# Patient Record
Sex: FEMALE | Race: WHITE | NOT HISPANIC OR LATINO | Employment: STUDENT | URBAN - METROPOLITAN AREA
[De-identification: names, ages, dates, MRNs, and addresses within clinical notes are randomized per-mention and may not be internally consistent; named-entity substitution may affect disease eponyms.]

---
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
## 2024-03-18 ENCOUNTER — OFFICE VISIT (OUTPATIENT)
Dept: PEDIATRICS | Facility: CLINIC | Age: 19
End: 2024-03-18
Payer: COMMERCIAL

## 2024-03-18 VITALS — TEMPERATURE: 97.7 F | WEIGHT: 144.4 LBS | OXYGEN SATURATION: 99 % | HEART RATE: 82 BPM | BODY MASS INDEX: 23.54 KG/M2

## 2024-03-18 DIAGNOSIS — H66.002 NON-RECURRENT ACUTE SUPPURATIVE OTITIS MEDIA OF LEFT EAR WITHOUT SPONTANEOUS RUPTURE OF TYMPANIC MEMBRANE: Primary | ICD-10-CM

## 2024-03-18 PROCEDURE — 1036F TOBACCO NON-USER: CPT | Performed by: PEDIATRICS

## 2024-03-18 PROCEDURE — 99213 OFFICE O/P EST LOW 20 MIN: CPT | Performed by: PEDIATRICS

## 2024-03-18 RX ORDER — AMOXICILLIN AND CLAVULANATE POTASSIUM 875; 125 MG/1; MG/1
875 TABLET, FILM COATED ORAL 2 TIMES DAILY
Qty: 20 TABLET | Refills: 0 | Status: SHIPPED | OUTPATIENT
Start: 2024-03-18 | End: 2024-03-28

## 2024-03-18 RX ORDER — AMOXICILLIN AND CLAVULANATE POTASSIUM 875; 125 MG/1; MG/1
1 TABLET, FILM COATED ORAL 2 TIMES DAILY
COMMUNITY
Start: 2024-02-28 | End: 2024-03-04

## 2024-03-18 RX ORDER — FLUTICASONE PROPIONATE 50 MCG
2 SPRAY, SUSPENSION (ML) NASAL
COMMUNITY
Start: 2024-02-13

## 2024-03-18 NOTE — LETTER
March 18, 2024     Patient: Minnie Read   YOB: 2005   Date of Visit: 3/18/2024       To Whom It May Concern:    Minnie Read was seen in my clinic on 3/18/2024 at 1:15 pm. Please excuse Minnie for her absence from school on this day to make the appointment.    If you have any questions or concerns, please don't hesitate to call.         Sincerely,         Tana Contreras MD PhD        CC: No Recipients

## 2024-03-18 NOTE — PROGRESS NOTES
Subjective   Patient ID: Minnie Read is a 18 y.o. female, otherwise healthy, who presents for URI (Was given 5 days of Augmentin for a sinus infection a few weeks ago. It relieved symptoms, then 2 days after the medication ended all symptoms came back. She has also had left ear pain since 2/6/24 and believes the ear drum may have ruptured. ).  She presents alone.    HPI  Minnie Read is a 18 y.o. female presenting for a sick visit. The patient was given 5 days of Augmentin 875 for a sinus infection a few weeks ago. It initially relieved symptoms, but symptoms returned two days after the medication was completed. The patient developed left ear pain with drainage on 3/6/24. She believes her eardrum may have ruptured. She reports cough and nasal congestion.     Review of Systems  The following history was obtained from patient.   Constitutional: Otherwise denies fever, chills, or changes in behavior. No difficulties with sleeping, eating, drinking, urine output, or bowel movements.    Eyes, ENT: Positive left ear pain with drainage, nasal congestion. Denies eye complaints, runny nose, or sore throat.   Cardio/Resp: Positive cough. Denies chest pain, palpitations, shortness of breath, wheezing, stridor at rest, working hard to breathe, or breathing fast.   GI/Renal: Denies nausea, vomiting, stomachache, diarrhea, or constipation. Denies dysuria or abnormal urine color or smell.   Musculoskeletal/Skin: Denies muscle or joint complaints. Denies skin rash.   Neuro/Psych: Denies headache, dizziness, confusion, irritability, or fussiness.   Endo/heme/lymph: Denies excessive thirst, excessive sweating, bruising, bleeding, or swollen glands.     Objective   Pulse 82   Temp 36.5 °C (97.7 °F) (Temporal)   Wt 65.5 kg (144 lb 6.4 oz)   SpO2 99%   BMI 23.54 kg/m²   BSA: 1.74 meters squared  Growth percentiles: No height on file for this encounter. 76 %ile (Z= 0.69) based on CDC (Girls, 2-20 Years) weight-for-age data  using vitals from 3/18/2024.     Physical Exam  Constitutional: Well developed, well nourished, well hydrated and no acute distress.  Head and Face: Normocephalic, atraumatic.  Inspection and palpation of the face: Normal.  Eyes: Conjunctiva and lids normal.  Ears, Nose, Mouth, and Throat: Left eardrum half-filled with pus. Post-nasal drip. External ears and nose without deformities.  Neck: No significant cervical adenopathy. Thyroid not enlarged.  Pulmonary: No grunting, flaring or retractions. Clear to auscultation.  Cardiovascular: Regular rate and rhythm. No significant murmur.  Chest: Normal without deformity.  Abdomen: Soft, non-tender, no masses. No hepatomegaly or splenomegaly.   Skin: No significant rash or lesions.    Problem List Items Addressed This Visit             ICD-10-CM       ENT    Non-recurrent acute suppurative otitis media of left ear without spontaneous rupture of tympanic membrane - Primary H66.002    Relevant Medications    amoxicillin-pot clavulanate (Augmentin) 875-125 mg tablet     Time in: 1:34 pm  Time done: 1:39 pm    Assessment/Plan    Minnie presents for a sick visit.    Your child has a left ear infection, or otitis media, and I have prescribed Augmentin 875 mg, and your child's dose is 1 tablet(s) twice a day for 10 days.      If your child starts to have diarrhea, I would recommend strongly giving your child acidophilus. You can give this to him/her as Culturelle, Florastor, Align, or other types. I would give your child a one-unit dose 2 hours after giving the antibiotic. If you give it at the same time as the antibiotic, there will be decreased effectiveness of the antibiotic. Ask the pharmacist what the one-unit dose for your child would be. Probiotics are not controlled by the FDA, so there is no unified dosing. Call if your child gets worse.      Colds can last up to 2 weeks and do not need antibiotics, as they are caused by a virus. There are things you can do to help a  cold get better faster.      #1 Hydrating your child will help a lot. For example, for an older child, 4-6 of the 16-ounce water bottles per day will help flush the virus from the system. Make sure your child is urinating once every 8 hours if they are older than one year old, and once every 6 hours if they are under the age of 1.      #2 Nasal saline washes will also clear the sinuses and not allow the mucous to get infected.      #3 Cool mist humidifier in the bedroom at night will help thin out the mucus as well. Just make sure it is cleaned regularly or you may be putting yeast spores into the environment. Near the humidifier equipment in all drugstores, you can find small balls that you put into the water of a cool mist humidifier, and it prevents growth of anything or the sliminess for up to a month. One brand is Protect.      #4 Vitamin and zinc supplements may also help to some degree. Please give zinc on a full stomach, as sometimes it can make children nauseous. Children from 1-6 years old may have 25 mg of zinc per day. Older than that may have 50 mg per day.     Scribe Attestation  By signing my name below, I, Karolyn Stewart, attest that this documentation has been prepared under the direction and in the presence of Dr. Tana Contreras.    Provider Attestation - Scribe documentation  All medical record entries made by the Scribe were at my direction and personally dictated by me. I have reviewed the chart and agree that the record accurately reflects my personal performance of the history, physical exam, discussion and plan.

## 2024-03-18 NOTE — PATIENT INSTRUCTIONS
Minnie presents for a sick visit.    Your child has a left ear infection, or otitis media, and I have prescribed Augmentin 875 mg, and your child's dose is 1 tablet(s) twice a day for 10 days.      If your child starts to have diarrhea, I would recommend strongly giving your child acidophilus. You can give this to him/her as Culturelle, Florastor, Align, or other types. I would give your child a one-unit dose 2 hours after giving the antibiotic. If you give it at the same time as the antibiotic, there will be decreased effectiveness of the antibiotic. Ask the pharmacist what the one-unit dose for your child would be. Probiotics are not controlled by the FDA, so there is no unified dosing. Call if your child gets worse.      Colds can last up to 2 weeks and do not need antibiotics, as they are caused by a virus. There are things you can do to help a cold get better faster.      #1 Hydrating your child will help a lot. For example, for an older child, 4-6 of the 16-ounce water bottles per day will help flush the virus from the system. Make sure your child is urinating once every 8 hours if they are older than one year old, and once every 6 hours if they are under the age of 1.      #2 Nasal saline washes will also clear the sinuses and not allow the mucous to get infected.      #3 Cool mist humidifier in the bedroom at night will help thin out the mucus as well. Just make sure it is cleaned regularly or you may be putting yeast spores into the environment. Near the humidifier equipment in all drugstores, you can find small balls that you put into the water of a cool mist humidifier, and it prevents growth of anything or the sliminess for up to a month. One brand is Protect.      #4 Vitamin and zinc supplements may also help to some degree. Please give zinc on a full stomach, as sometimes it can make children nauseous. Children from 1-6 years old may have 25 mg of zinc per day. Older than that may have 50 mg per day.

## 2024-04-05 ENCOUNTER — OFFICE VISIT (OUTPATIENT)
Dept: PEDIATRICS | Facility: CLINIC | Age: 19
End: 2024-04-05
Payer: COMMERCIAL

## 2024-04-05 VITALS — WEIGHT: 145.2 LBS | BODY MASS INDEX: 23.67 KG/M2 | TEMPERATURE: 97.5 F

## 2024-04-05 DIAGNOSIS — K52.9 ACUTE GASTROENTERITIS: Primary | ICD-10-CM

## 2024-04-05 PROCEDURE — 99213 OFFICE O/P EST LOW 20 MIN: CPT | Performed by: PEDIATRICS

## 2024-04-05 ASSESSMENT — ENCOUNTER SYMPTOMS
HEADACHES: 1
VOMITING: 1

## 2024-04-05 NOTE — PATIENT INSTRUCTIONS
Encourage rest and fluids.    Tylenol and ibuprofen as needed.    Call in 2-3 days if not better.     May use Imodium as needed for next 48 hours.

## 2024-04-05 NOTE — LETTER
April 5, 2024     Patient: Minnie Read   YOB: 2005   Date of Visit: 4/5/2024       To Whom It May Concern:    Minnie Read was seen in my clinic on 4/5/2024 at 1:50 pm. Please excuse Minnie for her absence from school on this day to make the appointment.  Please also excuse from classes 4/4/24 due to illness.      If you have any questions or concerns, please don't hesitate to call.         Sincerely,         Eric Rodriguez MD        CC: No Recipients

## 2024-04-05 NOTE — PROGRESS NOTES
Subjective   Chief Complaint: Vomiting and Headache.  Vomiting   Associated symptoms include headaches.   Headache   Associated symptoms include vomiting.     Minnie is a 18 y.o. female who presents for Vomiting and Headache, who is accompanied by her  self .    2 nights ago woke up with vomiting and had recurrent vomiting all that night.  There has been some diarrhea.  There has not been a fever.  Still with some headache.      Review of Systems   Gastrointestinal:  Positive for vomiting.   Neurological:  Positive for headaches.       Objective     Temp 36.4 °C (97.5 °F)   Wt 65.9 kg (145 lb 3.2 oz)   BMI 23.67 kg/m²     Physical Exam  Constitutional:       Appearance: Normal appearance.   HENT:      Head: Normocephalic and atraumatic.      Right Ear: Tympanic membrane normal.      Left Ear: Tympanic membrane normal.      Nose: Nose normal.      Mouth/Throat:      Mouth: Mucous membranes are moist.   Cardiovascular:      Rate and Rhythm: Normal rate and regular rhythm.   Pulmonary:      Effort: Pulmonary effort is normal.      Breath sounds: Normal breath sounds.   Abdominal:      General: Abdomen is flat. Bowel sounds are normal.      Palpations: Abdomen is soft.      Tenderness: There is no abdominal tenderness. There is no guarding.   Musculoskeletal:      Cervical back: Neck supple.   Neurological:      Mental Status: She is alert.       Assessment/Plan   Problem List Items Addressed This Visit       Acute gastroenteritis - Primary

## 2024-04-06 DIAGNOSIS — Z30.42 ENCOUNTER FOR SURVEILLANCE OF INJECTABLE CONTRACEPTIVE: Primary | ICD-10-CM

## 2024-04-09 RX ORDER — MEDROXYPROGESTERONE ACETATE 150 MG/ML
INJECTION, SUSPENSION INTRAMUSCULAR
Qty: 1 ML | Refills: 0 | Status: SHIPPED | OUTPATIENT
Start: 2024-04-09 | End: 2024-06-04 | Stop reason: ALTCHOICE

## 2024-06-04 ENCOUNTER — OFFICE VISIT (OUTPATIENT)
Dept: PEDIATRICS | Facility: CLINIC | Age: 19
End: 2024-06-04
Payer: COMMERCIAL

## 2024-06-04 VITALS
BODY MASS INDEX: 23.78 KG/M2 | HEIGHT: 66 IN | HEART RATE: 66 BPM | WEIGHT: 148 LBS | DIASTOLIC BLOOD PRESSURE: 76 MMHG | SYSTOLIC BLOOD PRESSURE: 122 MMHG

## 2024-06-04 DIAGNOSIS — J01.90 ACUTE NON-RECURRENT SINUSITIS, UNSPECIFIED LOCATION: ICD-10-CM

## 2024-06-04 DIAGNOSIS — L08.9 SKIN INFECTION: ICD-10-CM

## 2024-06-04 DIAGNOSIS — Z00.00 ENCOUNTER FOR WELL EXAM WITHOUT ABNORMAL FINDINGS OF PATIENT 18 YEARS OF AGE OR OLDER: Primary | ICD-10-CM

## 2024-06-04 PROCEDURE — 99395 PREV VISIT EST AGE 18-39: CPT | Performed by: PEDIATRICS

## 2024-06-04 PROCEDURE — 96127 BRIEF EMOTIONAL/BEHAV ASSMT: CPT | Performed by: PEDIATRICS

## 2024-06-04 PROCEDURE — 1036F TOBACCO NON-USER: CPT | Performed by: PEDIATRICS

## 2024-06-04 RX ORDER — AMOXICILLIN AND CLAVULANATE POTASSIUM 875; 125 MG/1; MG/1
TABLET, FILM COATED ORAL
Qty: 20 TABLET | Refills: 0 | Status: SHIPPED | OUTPATIENT
Start: 2024-06-04

## 2024-06-04 RX ORDER — MUPIROCIN 20 MG/G
OINTMENT TOPICAL
Qty: 22 G | Refills: 0 | Status: SHIPPED | OUTPATIENT
Start: 2024-06-04

## 2024-06-04 NOTE — PROGRESS NOTES
Here per self  General Health:  Overall healthy: yes  Concerns today: red and itchy in perineal area. Has shaved pubic hair. No drainage. For the last 3 weeks - she has been playing softball, swims a lot in their pool and is in a wet bathing suit for long periods of time  Sick frequently this school year  Sick for last 3 weeks of congestion and cough. Clear to green drainage, no fever, productive cough - green - can't hear well out of left ear. Thought she ruptured ear drum months ago due to drainage out of her ear.   Right thumb nail came off during a softball game about 4-5 days ago.   Social and Family History:  Any changes? no  Nutrition:  Well balanced diet and adequate calcium for age: yes  Dental Care:  Regular dental visits: yes  Brushes twice daily: yes  Elimination:  Elimination patterns appropriate: yes  Sleep:  Adequate sleep: no - not continuous sleep and difficulty falling asleep  Sleep problems: yes, trouble falling asleep and staying asleep  Women's health:  LMP:  May 9  Menstrual cycle pattern: regular and no problems -more regular now since off of depo - she had a lot of bleeding on depo  Education/work:  College: [employer] early childhood education major  Working:  center for the summer  Activities:  Softball,      RISK factors:  Dating? no  Sexual activity?    no      If yes, form of birth control:  Alcohol?  no  Marijuana? no  Drugs?  no  Smoking? no  Vaping? no  Safety Assessment:  Safety topics were reviewed  Seat belt: yes     Driving without distractions: yes  Sun safety/ Sunscreen: yes   Firearms in house:     Internet and texting safety: yes   Review of Systems:  Constitutional: Otherwise denies fever, chills, or changes in behavior. No difficulties with sleeping, eating, drinking, urine output, or bowel movements.    Eyes, ENT: Denies eye complaints, ear complaints, nasal congestion, runny nose, or sore throat.   Cardio/Resp: Denies chest pain, palpitations, shortness of breath,  wheezing, stridor at rest, cough, working hard to breathe, or breathing fast.   GI/Renal: Denies nausea, vomiting, stomachache, diarrhea, or constipation. Denies dysuria or abnormal urine color or smell.   Musculoskeletal/Skin: Denies muscle or joint complaints. Denies skin rash.   Neuro/Psych: Denies headache, dizziness, or confusion.  Endo/heme/lymph: Denies excessive thirst, excessive sweating, bruising, bleeding, or swollen glands.     PHQ 9 for 18 yr and older score: 5  Concerns: no  She has had some anxiety - had side effects on lexapro (felt more anxious). Has had concerns with focus since she was young but grades have been good  Physical Exam  Vitals reviewed.   Constitutional:          Appearance: Normal appearance  HENT:      Head: Normocephalic.      Right Ear: External ear normal and without deformities. Normal TM.      Left Ear: External ear normal and without deformities. Normal TM.      Nose: Nose normal, patent nares and without deformities.      Mouth/Throat: Normal palate     Mouth: Mucous membranes are moist.      Pharynx: Oropharynx is clear.   Neck:     General: Normal. No lymphadenopathy.     Eyes:      Extraocular Movements: Extraocular movements intact.      Conjunctiva/sclera: Conjunctivae normal.      Pupils: Pupils are equal, round, and reactive to light.   Cardiovascular:      Rate and Rhythm: Normal rate and regular rhythm.      Pulses: Normal pulses.      Heart sounds: Normal heart sounds.   Pulmonary:      Effort: Pulmonary effort is normal.      Breath sounds: Normal breath sounds.   Abdominal:      General: Abdomen is flat.      Palpations: Abdomen is soft.   Genitourinary:     Normal genitalia  In perineum - outside of labia and below labia she has redness, no drainage. She has shaved pubic hair and has several follicular pimples at upper labia, pubic area.  Musculoskeletal:         General: Normal range of motion, strength and tone.     Cervical back: Normal range of motion and  neck supple.   Skin:     General: Skin is warm and dry.      No rash or lesions  Right thumbnail is off - pink new skin/nail. No drainage or redness        Neurological:      General: No focal deficit present.      Mental Status: alert and oriented      Assessment/Plan:   18 yr well  Discussed men B vaccine and she will discuss with her parents further  Discussed sleep concerns and use melatonin nightly since it worked well in the past.  Start counseling for anxiety and stressors - can work with study habits and focus issues  Perineum irritation/redness.  - use hydrocortisone cream  1% 2-3x daily. Bactroban to small pimples

## 2024-06-04 NOTE — PATIENT INSTRUCTIONS
Discussed using hydrocortisone (cortaid) 1 % cream 2-3x per day in the reddened area. [facility] cream to apply 2-3x per day in the upper area.  Keep thumb clean and apply antibiotic ointment daily as needed.  We discussed men B vaccine - return as a nurse visit if you elect to get it. (2 doses 1 month apart)  Discussed sleep and taking melatonin - start with 3 mg one hour before bed  Discussed starting counseling.  Follow up yearly and as needed.

## 2024-06-24 ENCOUNTER — OFFICE VISIT (OUTPATIENT)
Dept: PEDIATRICS | Facility: CLINIC | Age: 19
End: 2024-06-24
Payer: COMMERCIAL

## 2024-06-24 VITALS — BODY MASS INDEX: 24.1 KG/M2 | TEMPERATURE: 98.8 F | WEIGHT: 149.3 LBS

## 2024-06-24 DIAGNOSIS — J01.91 ACUTE RECURRENT SINUSITIS, UNSPECIFIED LOCATION: Primary | ICD-10-CM

## 2024-06-24 PROCEDURE — 1036F TOBACCO NON-USER: CPT | Performed by: PEDIATRICS

## 2024-06-24 PROCEDURE — 99213 OFFICE O/P EST LOW 20 MIN: CPT | Performed by: PEDIATRICS

## 2024-06-24 RX ORDER — CEFDINIR 300 MG/1
300 CAPSULE ORAL 2 TIMES DAILY
Qty: 20 CAPSULE | Refills: 0 | Status: SHIPPED | OUTPATIENT
Start: 2024-06-24 | End: 2024-06-28

## 2024-06-24 RX ORDER — FLUTICASONE PROPIONATE 50 MCG
2 SPRAY, SUSPENSION (ML) NASAL
Qty: 16 G | Refills: 11 | Status: SHIPPED | OUTPATIENT
Start: 2024-06-24

## 2024-06-24 ASSESSMENT — ENCOUNTER SYMPTOMS
FEVER: 1
RHINORRHEA: 1
HEADACHES: 1
COUGH: 1
SHORTNESS OF BREATH: 0
NAUSEA: 0
EYE DISCHARGE: 0
DIARRHEA: 0
FATIGUE: 1
DYSURIA: 0
FLANK PAIN: 0
SORE THROAT: 1
EYE REDNESS: 0
MYALGIAS: 1
VOMITING: 0
APPETITE CHANGE: 0

## 2024-06-24 NOTE — PATIENT INSTRUCTIONS
Use the Flonase consistently every day.  Take the antibiotic as prescribed.  Contact the office if symptoms are not improving over the next 2-3 days, or if any symptoms are worsening.  Give a probiotic supplement daily or eat yogurt daily  to help prevent stomach upset and diarrhea while on the antibiotic.  If symptoms persist, I will consider changing your antibiotic again, and referring you to see an ENT specialist.

## 2024-06-24 NOTE — PROGRESS NOTES
Subjective   Patient ID: Minnie Read is a 18 y.o. female with history of sinusitis  who presents for Cough (Cough, cold symptoms, sore throat, skin hurts ).    HPI:  Minnie has had URI symptoms and sinusitis for the past several months.  She was treated with Augmentin, and symptoms started to improve, but worsened again when she stopped the medication.  She had a fever yesterday, as well as malaise, sore throat, and cough.            Review of Systems   Constitutional:  Positive for fatigue and fever. Negative for appetite change.   HENT:  Positive for congestion, rhinorrhea and sore throat.         Left ear feels blocked.    Eyes:  Negative for discharge and redness.   Respiratory:  Positive for cough. Negative for shortness of breath.    Gastrointestinal:  Negative for diarrhea, nausea and vomiting.   Genitourinary:  Negative for dysuria and flank pain.   Musculoskeletal:  Positive for myalgias.   Skin:  Negative for rash.   Neurological:  Positive for headaches.       Objective   Temp 37.1 °C (98.8 °F)   Wt 67.7 kg (149 lb 4.8 oz)   BMI 24.10 kg/m²   BSA: 1.78 meters squared  Growth percentiles: No height on file for this encounter. 80 %ile (Z= 0.83) based on CDC (Girls, 2-20 Years) weight-for-age data using vitals from 6/24/2024.     Physical Exam  Vitals reviewed.   Constitutional:       Appearance: Normal appearance.   HENT:      Left Ear: Tympanic membrane normal.      Ears:      Comments: Right TM with effusion.      Nose: Congestion present.      Mouth/Throat:      Mouth: Mucous membranes are moist.      Pharynx: Oropharynx is clear. Posterior oropharyngeal erythema present.      Comments: Purulent post nasal drainage.   Eyes:      General:         Right eye: No discharge.         Left eye: No discharge.      Conjunctiva/sclera: Conjunctivae normal.   Cardiovascular:      Rate and Rhythm: Normal rate and regular rhythm.      Heart sounds: Normal heart sounds.   Pulmonary:      Effort: Pulmonary  effort is normal.      Breath sounds: Normal breath sounds.   Musculoskeletal:      Cervical back: Neck supple.   Lymphadenopathy:      Cervical: No cervical adenopathy.   Skin:     Findings: No rash.   Neurological:      Mental Status: She is alert.         Assessment/Plan   Diagnoses and all orders for this visit:  Acute recurrent sinusitis, unspecified location  -     fluticasone (Flonase) 50 mcg/actuation nasal spray; Administer 2 sprays into each nostril once daily.  -     cefdinir (Omnicef) 300 mg capsule; Take 1 capsule (300 mg) by mouth 2 times a day for 10 days.  Saline nasal spray PRN.  Call if symptoms are not improving in the next several days.  Will consider ENT referral if sinus symptoms persist.

## 2024-06-24 NOTE — LETTER
June 24, 2024     Patient: Minnie Read   YOB: 2005   Date of Visit: 6/24/2024       To Whom It May Concern:    Minnie Read was seen in my clinic on 6/24/2024 at 10:00 am. Please excuse Minnie for her absence from work on this day to make the appointment.  She will return to work when symptoms are improving and she has been free of fever for 24 hours.    If you have any questions or concerns, please don't hesitate to call.         Sincerely,         Caitlyn Kate MD        CC: No Recipients

## 2024-06-28 DIAGNOSIS — J01.91 ACUTE RECURRENT SINUSITIS, UNSPECIFIED LOCATION: Primary | ICD-10-CM

## 2024-06-28 RX ORDER — LEVOFLOXACIN 750 MG/1
750 TABLET ORAL DAILY
Qty: 10 TABLET | Refills: 0 | Status: SHIPPED | OUTPATIENT
Start: 2024-06-28 | End: 2024-07-08

## 2024-08-04 ENCOUNTER — HOSPITAL ENCOUNTER (OUTPATIENT)
Dept: RADIOLOGY | Facility: EXTERNAL LOCATION | Age: 19
Discharge: HOME | End: 2024-08-04
Payer: COMMERCIAL

## 2024-08-04 DIAGNOSIS — R05.1 ACUTE COUGH: ICD-10-CM

## 2024-08-15 ENCOUNTER — APPOINTMENT (OUTPATIENT)
Dept: PEDIATRICS | Facility: CLINIC | Age: 19
End: 2024-08-15
Payer: COMMERCIAL

## 2024-08-15 VITALS — WEIGHT: 150 LBS | BODY MASS INDEX: 24.21 KG/M2

## 2024-08-15 DIAGNOSIS — B37.31 YEAST INFECTION INVOLVING THE VAGINA AND SURROUNDING AREA: Primary | ICD-10-CM

## 2024-08-15 PROBLEM — H66.002 NON-RECURRENT ACUTE SUPPURATIVE OTITIS MEDIA OF LEFT EAR WITHOUT SPONTANEOUS RUPTURE OF TYMPANIC MEMBRANE: Status: RESOLVED | Noted: 2024-03-18 | Resolved: 2024-08-15

## 2024-08-15 PROCEDURE — 1036F TOBACCO NON-USER: CPT | Performed by: PEDIATRICS

## 2024-08-15 PROCEDURE — 99213 OFFICE O/P EST LOW 20 MIN: CPT | Performed by: PEDIATRICS

## 2024-08-15 RX ORDER — FLUCONAZOLE 150 MG/1
TABLET ORAL
Qty: 2 TABLET | Refills: 0 | Status: SHIPPED | OUTPATIENT
Start: 2024-08-15

## 2024-08-15 NOTE — PROGRESS NOTES
Subjective    Minnie Read is a 18 y.o. female who presents for Follow-up.  Accompanied by self    HPI  For the last couple of weeks she has been itchy and red in perineal area. Seen for something similar in June and bactroban worked. This time it did not. She has shaved pubic area again as well. She occasionally has a whitish discharge and sometimes it looks like peeling skin. She does not feel that it is a rash.  Recently had pneumonia and finished antibiotic last night. Still coughing some but feels much better.        Objective   Wt 68 kg (150 lb)   BMI 24.21 kg/m²   BSA: 1.78 meters squared  Growth percentiles: No height on file for this encounter. 80 %ile (Z= 0.84) based on CDC (Girls, 2-20 Years) weight-for-age data using data from 8/15/2024.     Physical Exam  Perineum is red, there is what appears mild skin peeling. Small amount whitish discharge. No pimple areas as before.   Lungs are clear - has loose cough on occasion during exam.    Assessment/Plan   Yeast infection  Diflucan once today and second pill in one week  Lotrimin AF twice daily for approx one week  Follow up if further concerns  Problem List Items Addressed This Visit    None

## 2024-08-15 NOTE — PATIENT INSTRUCTIONS
Take the medicine today  and one week from now.  Also use lotrimin AF - cream over the counter to be applied twice daily  Keep area clean and dry, avoid tight pants  Call if further concerns

## 2024-08-21 ENCOUNTER — APPOINTMENT (OUTPATIENT)
Dept: PEDIATRICS | Facility: CLINIC | Age: 19
End: 2024-08-21
Payer: COMMERCIAL

## 2024-08-21 DIAGNOSIS — Z23 ENCOUNTER FOR IMMUNIZATION: Primary | ICD-10-CM

## 2024-08-21 PROCEDURE — 90471 IMMUNIZATION ADMIN: CPT | Performed by: PEDIATRICS

## 2024-08-21 PROCEDURE — 90620 MENB-4C VACCINE IM: CPT | Performed by: PEDIATRICS

## 2024-08-22 ENCOUNTER — APPOINTMENT (OUTPATIENT)
Dept: OTOLARYNGOLOGY | Facility: CLINIC | Age: 19
End: 2024-08-22
Payer: COMMERCIAL

## 2024-08-22 VITALS
TEMPERATURE: 98.3 F | DIASTOLIC BLOOD PRESSURE: 76 MMHG | SYSTOLIC BLOOD PRESSURE: 114 MMHG | BODY MASS INDEX: 24.27 KG/M2 | WEIGHT: 151 LBS | HEIGHT: 66 IN

## 2024-08-22 DIAGNOSIS — J30.89 ALLERGIC RHINITIS DUE TO OTHER ALLERGIC TRIGGER, UNSPECIFIED SEASONALITY: ICD-10-CM

## 2024-08-22 PROCEDURE — 99203 OFFICE O/P NEW LOW 30 MIN: CPT

## 2024-08-22 PROCEDURE — 31231 NASAL ENDOSCOPY DX: CPT

## 2024-08-22 PROCEDURE — 3008F BODY MASS INDEX DOCD: CPT

## 2024-08-22 RX ORDER — FLUTICASONE PROPIONATE 50 MCG
2 SPRAY, SUSPENSION (ML) NASAL DAILY
Qty: 16 G | Refills: 11 | Status: SHIPPED | OUTPATIENT
Start: 2024-08-22 | End: 2025-08-22

## 2024-08-22 NOTE — PROGRESS NOTES
Patient ID: Minnie Read is a 18 y.o. female who presents for the evaluation of nasal congestion, cough, and sore throat. They present as a referral from Dr. Caitlyn Kate (PCP).    PROVIDER IMPRESSIONS:  DIAGNOSES/PROBLEMS:  -Allergic rhinitis    ASSESSMENT:   Minnie Read is a pleasant 18 y.o. female who presents for the evaluation of persistent nasal congestion, sore throat, and cough. Based on the clinical information provided, symptoms and clinical findings are consistent with allergic rhinitis. Nasal endoscopy was performed today and findings detailed in the procedure note below, which revealed mucosa swollen, pale, and boggy, the septum was deviated left. There was no evidence of nasal polyps, masses, lesions, bleeding, or purulence. I discussed the findings of the patient and offered reassurance and counseling.     PLAN  I recommended patient restarts daily and consistent use of nasal steroid spray fluticasone (Flonase). I recommended nasal steroid spray administration of either 2 puffs into each nostril daily or 1 puff into each nostril twice daily. Patient counseled that topical intranasal steroid nasal spray is indicated to reduce nasal inflammation and may take 4-6 weeks after initiation of a consistent medication trial to notice symptom benefit. Patient was educated on proper administration of nasal spray, by tilting their head down and pointing the applicator tip towards the lateral wall of the nose/corner of the eye on the same side. I advised patient to avoid pointing applicator toward the septum due to the risk of nasal bleeding. Patient informed to discontinue the spray if they experience any adverse side effects (I.e. nasal bleeding). This medication may be purchased over the counter; a prescription was sent to the patient's pharmacy upon request (x 11 refills).   I recommend the patient starts to perform intranasal saline irrigations, either once daily or every other day. I educated the  "patient that saline irrigations aid in flushing out residual mucus, crusts, allergens or other environmental irritants in the nasal cavity. I counseled the patient on appropriate administration of saline irrigations. I emphasized that this will help clean the nasal cavity and should be performed PRIOR to administration of any topical intranasal spray regimen. I informed the patient that saline irrigation kits may be purchased over the counter at the pharmacy.  I recommend oral antihistamine therapy for symptoms of sneezing and itching. I recommended selecting a second-generation antihistamine, and explained that they are less sedating. I recommended selecting an o.t.c. oral antihistamine such as o.t.c. cetirizine (Zyrtec), loratadine (Claritin), or fexofenadine (Allegra) unless otherwise contraindicated. I advised the patient to avoid consistent use of oral antihistamines that are combined with decongestants. Patient may rotate to an alternative o.t.c. oral antihistamine option (as listed above) every 6 months or more if they experience any noticeable decline in symptom relief.   I advised the patient to avoid known allergens. I also advised the patient to initiate the applicable environmental controls: Removal of pets, the use of an air filtration system, bed covers, and acaricides.    Follow-up: Patient may schedule for follow-up in 4 months (upon returning from college) to evaluate symptom benefit and/or treatment outcomes, sooner as needed. May consider referral to allergy/immunology pending follow-up. Patient is agreeable to this plan, all questions were answered to patient's satisfaction.     Subjective   HPI: Minnie Read is a 18 y.o. female who presents for complaints of sinus and nose problems that began approximately 1 year ago. The patient describes sinus/nose problems as persistent nasal congestion. She reports that throughout her freshman year at [employer] she was \"always sick\" and experienced " symptoms continuously. States that she has episodes of recurrent facial pressure located at the cheeks. When asked about the presence of rhinorrhea, facial pain, nasal obstruction, loss of smell, or loss of taste, the patient admits to none. The patient denies epistaxis. The patient mentions other associated symptoms of cough and intermittent sore throat episodes. Patient denies the presence of fever, headache, periorbital edema, epiphora, or ear pressure. Patient seen for symptoms multiple times over the past 2 months and received multiple courses of antibiotics including p.o. augmentin x 2, p.o. levofloxacin, p.o. z-marychuy, and p.o. cefdinir. Mentions she has been treated with p.o. course of augmentin approximately 5 times in the past year for sinus infections. She was seen by PCP on 6/24/24 and advised to start Flonase nasal spray. States she did not take p.o. cefdinir and used flonase for approximately 1 week with no symptom benefit. Mentions she also received recent treatment for pneumonia. When asked about a past medical history of allergies, asthma, aspirin sensitivity, migraines, nasal fracture/trauma, or heartburn/reflux, the patient admits to none. The patient denies prior allergy/immunology testing. Patient denies prior sinonasal surgery and denies sinonasal imaging within the past 5 years. Mentions she attends [employer] and is leaving tomorrow for the fall semester.      PATIENT HISTORY:  Past Medical History:   Diagnosis Date    Acute pharyngitis, unspecified 04/04/2016    Sore throat    Chronic maxillary sinusitis 01/02/2016    Maxillary sinusitis, unspecified chronicity    Non-recurrent acute suppurative otitis media of left ear without spontaneous rupture of tympanic membrane 03/18/2024    Other conditions influencing health status     Full-term infant    Pain, unspecified 04/04/2016    Body aches    Personal history of other diseases of the respiratory system 01/02/2016    History of  pharyngitis    Personal history of other specified conditions     History of lymphadenopathy      No past surgical history on file.   No Known Allergies     Current Outpatient Medications:     fluconazole (Diflucan) 150 mg tablet, Take one tablet today and one a week later, Disp: 2 tablet, Rfl: 0    fluticasone (Flonase) 50 mcg/actuation nasal spray, Administer 2 sprays into each nostril once daily., Disp: 16 g, Rfl: 11    mupirocin (Bactroban) 2 % ointment, Apply topically to affected area 2-3x per day, Disp: 22 g, Rfl: 0   Tobacco Use: Low Risk  (6/24/2024)    Patient History     Smoking Tobacco Use: Never     Smokeless Tobacco Use: Never     Passive Exposure: Never      Alcohol Use: Not on file      Social History     Substance and Sexual Activity   Drug Use Never        Review of Systems   All other systems negative.     Objective   Visit Vitals  Smoking Status Never        PHYSICAL EXAM:  General appearance: Appears well, well-nourished, well groomed. No acute distress.   Constitutional: No fever, chills, weight loss or weight gain.  Communication: Normal communication  Psychiatric: Oriented to person, place and time. Normal mood and affect.  Neurologic: Cranial nerves II-XII grossly intact and symmetric bilaterally.  Cardiovascular: Examination of peripheral vascular system shows no clubbing or cyanosis.  Respiratory: No respiratory distress increased work of breathing. Inspection of the chest with symmetric chest expansion and normal respiratory effort.  Skin: No head and neck rashes.  Head: Normocephalic. Atraumatic with no masses, lesions or scarring.  Face: Normal symmetry. No scars or deformities.  Eyes: Conjunctiva not edematous or erythematous. PERRLA  Neck: Supple and symmetric, trachea midline. Lymph nodes with no adenopathy.  Head: Normocephalic. Atraumatic with no masses, lesions or scarring.  Eyes: PERRL, EOMI, Conjunctiva is clear. No nystagmus.  Nose: External inspection of nose: No nasal  lesions, lacerations or scars. No tenderness on frontal or maxillary sinus palpation. Anterior rhinoscopy with limited visualization past the inferior turbinates: Septum is deviated left.  No septal perforation or lesions. No septal hematoma/ seroma.  No signs of bleeding.  No evidence of intranasal polyps.  Inferior turbinates are hypertrophied. Nasal mucosa appears pale/boggy and moist.  Throat:  Floor of mouth is clear, no masses.  Tongue appears normal, no lesions or masses. Gums, gingiva, buccal mucosa appear pink and moist, no lesions. Teeth are in intact.  No obvious dental infections.  Peritonsillar regions appear symmetric without swelling.  Hard and soft palate appear normal, no obvious cleft. Uvula is midline.  Left Tonsil -- 2+, no exudates, no erythema.   Right Tonsil -- 2+, no exudates, no erythema.   Oropharynx: No lesions. Retropharyngeal wall is flat.  No postnasal drip.  Salivary Glands: Symmetric bilaterally.  No palpable masses.  No evidence of acute infection or salivary stones.  TMJ: Normal, no trismus.  Right Ear: External inspection of ear with no deformity, scars, or masses. Mastoid is nontender. External auditory canal is clear. TM is intact with no sign of infection, effusion, or retraction. No perforation seen.   Left Ear: External inspection of ear with no deformity, scars, or masses. Mastoid is nontender. External auditory canal clear.  TM is intact with no sign of infection, effusion, or retraction. No perforation seen.     RECENT LAB RESULTS:  No results found for this or any previous visit (from the past 24 hour(s)).      PROCEDURE NOTE: NASAL ENDOSCOPY-DIAGNOSTIC  Indication: concern for sinusitis  Procedure Note: For better visualization, flexible nasal endoscopy examination was performed.  Verbal consent was obtained by the patient and/or guardian. The risks, benefits, alternatives, and expectations were discussed with the patient, who wished to proceed. Both nostrils were sprayed  with a mixture of lidocaine 4% and topical nasal decongestant oxymetazoline. After a sufficient amount of time elapsed for mucosal anesthesia to take place, the flexible fiberoptic nasopharyngoscope was advanced into the right naris, and then left naris. The following areas were visualized: Nasal passage, nasal septum, turbinates, middle meatus, nasopharynx, sinus ostia. At the end of the procedure, the nasopharyngeal scope removed without difficulty.   Outcomes: There were no complications and the patient tolerated the procedure well.    Procedure Findings:  -The nasal septum was deviated left. No evidence of septal perforation or hematoma.  - Right: The inferior turbinate was hypertrophic. The middle turbinate appeared hypertrophic. The middle meatus and spheno-ethmoid recess were free of purulence, masses, lesions, or polyps. The nasal passageway is patent. The nasopharynx was clear. The nasal mucosal lining was pale/boggy and moist, no evidence of bleeding, lesions, or ulceration.   - Left: The inferior turbinate was hypertrophic. The middle turbinate appeared hypertrophic. The middle meatus and spheno-ethmoid recess were free of purulence, masses, or polyps. The nasal passageway is patent. The nasopharynx was clear. The nasal mucosal lining was pale/boggy and moist, no evidence of bleeding, lesions or ulceration.      Irish Deng, APRN-CNP

## 2024-08-27 NOTE — PATIENT INSTRUCTIONS
NASAL STEROID SPRAY PATIENT INSTRUCTIONS: Please use the recommended topical nasal spray as directed. BE SURE TO POINT THE SPRAY TOWARDS THE CORNER OF THE EYE ON THE SAME SIDE NOSTRIL. This will ensure you are treating the appropriate parts of your nose that are swollen or inflamed. This medication will take upwards of one month before you notice full benefit. You MUST use it every day for it to be effective. This medication may be purchase over-the-counter or prescription may be submitted upon request.

## 2024-12-17 ENCOUNTER — APPOINTMENT (OUTPATIENT)
Dept: OTOLARYNGOLOGY | Facility: CLINIC | Age: 19
End: 2024-12-17
Payer: COMMERCIAL

## 2024-12-31 ENCOUNTER — OFFICE VISIT (OUTPATIENT)
Dept: PEDIATRICS | Facility: CLINIC | Age: 19
End: 2024-12-31
Payer: COMMERCIAL

## 2024-12-31 VITALS
HEART RATE: 92 BPM | RESPIRATION RATE: 18 BRPM | WEIGHT: 161.2 LBS | TEMPERATURE: 97.5 F | BODY MASS INDEX: 26.02 KG/M2 | OXYGEN SATURATION: 99 %

## 2024-12-31 DIAGNOSIS — J02.0 STREP THROAT: Primary | ICD-10-CM

## 2024-12-31 PROCEDURE — 1036F TOBACCO NON-USER: CPT | Performed by: PEDIATRICS

## 2024-12-31 PROCEDURE — 99213 OFFICE O/P EST LOW 20 MIN: CPT | Performed by: PEDIATRICS

## 2024-12-31 RX ORDER — AMOXICILLIN 500 MG/1
1000 CAPSULE ORAL 2 TIMES DAILY
Qty: 40 CAPSULE | Refills: 0 | Status: SHIPPED | OUTPATIENT
Start: 2024-12-31 | End: 2025-01-10

## 2024-12-31 NOTE — PROGRESS NOTES
Subjective   Patient ID: Minnie Read is a 19 y.o. female, otherwise healthy, who presents for Sore Throat (Sore throat, body chills for 2 days ).    HPI  Minnie Read is a 19 y.o. female presenting for a sick visit. Yesterday, the patient woke up with a sore throat. She also had chills last night. She works at a .    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    Review of Systems  The following history was obtained from patient.   Constitutional: Positive chills. Otherwise denies fever, or changes in behavior. No difficulties with sleeping, eating, drinking, urine output, or bowel movements.    Eyes, ENT: Positive sore throat. Denies eye complaints, ear complaints, nasal congestion, runny nose.   Cardio/Resp: Denies chest pain, palpitations, shortness of breath, wheezing, stridor at rest, cough, working hard to breathe, or breathing fast.   GI/Renal: Denies nausea, vomiting, stomachache, diarrhea, or constipation. Denies dysuria or abnormal urine color or smell.   Musculoskeletal/Skin: Denies muscle or joint complaints. Denies skin rash.   Neuro/Psych: Denies headache, dizziness, confusion, irritability, or fussiness.   Endo/heme/lymph: Denies excessive thirst, excessive sweating, bruising, bleeding, or swollen glands.     Current Outpatient Medications   Medication Sig Dispense Refill    amoxicillin (Amoxil) 500 mg capsule Take 2 capsules (1,000 mg) by mouth 2 times a day for 10 days. 40 capsule 0    fluconazole (Diflucan) 150 mg tablet Take one tablet today and one a week later (Patient not taking: Reported on 12/31/2024) 2 tablet 0    fluticasone (Flonase) 50 mcg/actuation nasal spray Administer 2 sprays into each nostril once daily. Shake gently. Before first use, prime pump. After use, clean tip and replace cap. (Patient not taking: Reported on 12/31/2024) 16 g 11    mupirocin (Bactroban) 2 % ointment Apply topically to affected area 2-3x per day (Patient not taking:  Reported on 12/31/2024) 22 g 0     No current facility-administered medications for this visit.        No Known Allergies     No family history on file.     Objective   Pulse 92   Temp 36.4 °C (97.5 °F)   Resp 18   Wt 73.1 kg (161 lb 3.2 oz)   SpO2 99%   BMI 26.02 kg/m²   BSA: 1.84 meters squared  Growth percentiles: Facility age limit for growth %jet is 20 years. Facility age limit for growth %jet is 20 years.     Physical Exam  Constitutional: Well developed, well nourished, well hydrated and no acute distress.  HENT:      Head: Normocephalic, atraumatic, normal palpation and inspection of face.      Ears: External ears normal and without deformities. Normal TMs.       Nose: Nose normal, patent nares and without deformities.      Mouth/Throat: Very injected tonsillar pillars, uvula, and tonsils, no exudate.  Eyes: Conjunctiva and lids normal.  Neck: Bilateral anterior cervical lymph nodes are 0.5 cm in diameter, non-tender and mobile.    Pulmonary: No grunting, flaring or retractions. Clear to auscultation.  Cardiovascular: Regular rate and rhythm. No significant murmur.  Chest: Normal without deformity.  Abdomen: Soft, non-tender, no masses. No hepatomegaly or splenomegaly.   Skin: No significant rash or lesions.    Problem List Items Addressed This Visit             ICD-10-CM       ENT    Strep throat - Primary J02.0    Relevant Medications    amoxicillin (Amoxil) 500 mg capsule     Time in: 9:30 am  Time done: 9:39 am    Assessment/Plan    Minnie presents for a sick visit.    A Rapid Strep Test was done and came back positive.     Your child has strep throat. I have prescribed Amoxicillin 500 mg, and your child's dose is 2 tablet(s) twice a day for 10 days.     The child remains contagious until the child has been on antibiotics for 12 hours. Due to the updated recommendations by the American Academy of pediatrics, if the child has received antibiotics by 5 PM on day 1, they may go back to school on day  2. There are some home recommendations to prevent the strep from returning.      #1 the child can get a rash in the next 48 hours, usually it is a sandpaper-like rash in the neck and chest area. This is part of the strep infection, don't worry about it.      #2 everything in the next 3 sections does not happen today, they happen after the child has been on antibiotics for 24 hours.      #3 laundry, please change the child's sheets, linens, and towels. They should be laundered in hot water and detergent.       #4 replace a toothbrush at 24 hours. I would recommend two toothbrushes. The first one that is less expensive should be started after 24 hours.  That toothbrush, when not being used, should sit in Listerine to prevent further infection from night to night. The second toothbrush would be a nicer toothbrush to replace the less expensive toothbrush, after the antibiotics are completed in 10 days.      #5 please clean the bathroom and any surfaces or toys that the child touches all the time. These include handles, bannisters, and game controllers. Whatever you cannot bleach, you may use spray .     Scribe Attestation  By signing my name below, I, Karolyn Stewart, attest that this documentation has been prepared under the direction and in the presence of Dr. Tana Contreras.    Provider Attestation - Scribe documentation  All medical record entries made by the Scribe were at my direction and personally dictated by me. I have reviewed the chart and agree that the record accurately reflects my personal performance of the history, physical exam, discussion and plan.

## 2024-12-31 NOTE — PATIENT INSTRUCTIONS
Minnie presents for a sick visit.    A Rapid Strep Test was done and came back positive.     Your child has strep throat. I have prescribed Amoxicillin 500 mg, and your child's dose is 2 tablet(s) twice a day for 10 days.     The child remains contagious until the child has been on antibiotics for 12 hours. Due to the updated recommendations by the American Academy of pediatrics, if the child has received antibiotics by 5 PM on day 1, they may go back to school on day 2. There are some home recommendations to prevent the strep from returning.      #1 the child can get a rash in the next 48 hours, usually it is a sandpaper-like rash in the neck and chest area. This is part of the strep infection, don't worry about it.      #2 everything in the next 3 sections does not happen today, they happen after the child has been on antibiotics for 24 hours.      #3 laundry, please change the child's sheets, linens, and towels. They should be laundered in hot water and detergent.       #4 replace a toothbrush at 24 hours. I would recommend two toothbrushes. The first one that is less expensive should be started after 24 hours.  That toothbrush, when not being used, should sit in Listerine to prevent further infection from night to night. The second toothbrush would be a nicer toothbrush to replace the less expensive toothbrush, after the antibiotics are completed in 10 days.      #5 please clean the bathroom and any surfaces or toys that the child touches all the time. These include handles, bannisters, and game controllers. Whatever you cannot bleach, you may use spray .

## 2024-12-31 NOTE — LETTER
December 31, 2024     Patient: Minnie Read   YOB: 2005   Date of Visit: 12/31/2024       To Whom It May Concern:    Minnie Read was seen in my clinic on 12/31/2024 at 9:00 am. Please excuse Minnie for her absence from school on this day to make the appointment.  No work until tomorrow.    If you have any questions or concerns, please don't hesitate to call.         Sincerely,         Tana Contreras MD PhD        CC: No Recipients

## 2025-06-03 ENCOUNTER — OFFICE VISIT (OUTPATIENT)
Dept: PEDIATRICS | Facility: CLINIC | Age: 20
End: 2025-06-03
Payer: COMMERCIAL

## 2025-06-03 ENCOUNTER — HOSPITAL ENCOUNTER (OUTPATIENT)
Dept: RADIOLOGY | Facility: CLINIC | Age: 20
Discharge: HOME | End: 2025-06-03
Payer: COMMERCIAL

## 2025-06-03 VITALS — BODY MASS INDEX: 26.1 KG/M2 | TEMPERATURE: 98.7 F | HEIGHT: 66 IN | WEIGHT: 162.4 LBS

## 2025-06-03 DIAGNOSIS — M25.571 CHRONIC PAIN OF RIGHT ANKLE: Primary | ICD-10-CM

## 2025-06-03 DIAGNOSIS — G89.29 CHRONIC PAIN OF RIGHT ANKLE: Primary | ICD-10-CM

## 2025-06-03 DIAGNOSIS — G89.29 CHRONIC PAIN OF RIGHT ANKLE: ICD-10-CM

## 2025-06-03 DIAGNOSIS — M25.571 CHRONIC PAIN OF RIGHT ANKLE: ICD-10-CM

## 2025-06-03 PROCEDURE — 1036F TOBACCO NON-USER: CPT | Performed by: PEDIATRICS

## 2025-06-03 PROCEDURE — 99213 OFFICE O/P EST LOW 20 MIN: CPT | Performed by: PEDIATRICS

## 2025-06-03 PROCEDURE — 73610 X-RAY EXAM OF ANKLE: CPT | Mod: RT

## 2025-06-03 PROCEDURE — 3008F BODY MASS INDEX DOCD: CPT | Performed by: PEDIATRICS

## 2025-06-03 PROCEDURE — 73610 X-RAY EXAM OF ANKLE: CPT | Mod: RIGHT SIDE | Performed by: RADIOLOGY

## 2025-06-03 NOTE — PROGRESS NOTES
"Subjective   Patient ID: Minnie Read is a 19 y.o. female otherwise healthy who presents for Ankle Pain (Right foot / ankle pain ).    HPI:    History of Present Illness  The patient presents for evaluation of ankle pain.    She reports a history of an ankle sprain a few years ago, which has been causing intermittent discomfort since the incident. Over the past week, she has experienced a significant exacerbation of her symptoms, coinciding with the start of a new job that requires prolonged standing for 12 to 13 hours daily. She typically wears tennis shoes for work and has found some relief from the use of KT tape when applied tightly. She describes a popping sensation in her Achilles tendon and medial aspect of her ankle, which is particularly pronounced during stair climbing or when standing on her toes. The popping is not associated with severe pain but is described as uncomfortable. She also reports mild swelling and occasional numbness in the affected area. Her daily gym routine includes extensive use of the StairMaster, which she notes exacerbates her symptoms. She is planning to resume playing softball, a sport she has not engaged in for some time. She has not sought physical therapy following her initial sprain. She recalls undergoing an x-ray examination at the time of the sprain and was temporarily immobilized with crutches.      Objective   Temp 37.1 °C (98.7 °F)   Ht 1.67 m (5' 5.75\")   Wt 73.7 kg (162 lb 6.4 oz)   BMI 26.41 kg/m²   BSA: 1.85 meters squared  Growth percentiles: Facility age limit for growth %jet is 20 years. Facility age limit for growth %jet is 20 years.     Physical Exam  Constitutional:       Appearance: Normal appearance.   Musculoskeletal:      Comments: Pain on palpation over right Achilles tendon and lateral ankle joint.  No visible swelling.  Full ROM of ankle.  No joint instability.  Normal ankle strength.     Neurological:      Mental Status: She is alert. "         Assessment/Plan   Diagnoses and all orders for this visit:  Chronic pain of right ankle  -     XR ankle right 3+ views; Future  -     Referral to Physical Therapy; Future      Assessment & Plan  1. Ankle pain.  The patient reports chronic ankle pain exacerbated by recent prolonged standing at work. She experiences pain when using the StairMaster and during activities that involve flexing the foot or standing on tiptoes. There is tenderness over the Achilles tendon and occasional numbness. An x-ray will be conducted to rule out any bone abnormalities. If the x-ray is normal, physical therapy will be considered to strengthen the ankle and address potential Achilles tendinitis. She is advised to avoid gym activities that exacerbate the pain. If symptoms persist or worsen, a referral to orthopedics for an MRI may be necessary.    Follow-up: The patient will be contacted with x-ray results. If the x-ray is normal, physical therapy will be initiated.        This medical note was created with the assistance of artificial intelligence (AI) for documentation purposes. The content has been reviewed and confirmed by the healthcare provider for accuracy and completeness. Patient consented to the use of audio recording and use of AI during their visit.

## 2025-06-18 ENCOUNTER — OFFICE VISIT (OUTPATIENT)
Dept: PEDIATRICS | Facility: CLINIC | Age: 20
End: 2025-06-18
Payer: COMMERCIAL

## 2025-06-18 VITALS
HEIGHT: 66 IN | OXYGEN SATURATION: 98 % | WEIGHT: 162.6 LBS | TEMPERATURE: 98.9 F | HEART RATE: 118 BPM | BODY MASS INDEX: 26.13 KG/M2

## 2025-06-18 DIAGNOSIS — J02.0 STREP THROAT: Primary | ICD-10-CM

## 2025-06-18 LAB — POC STREP A RESULT: POSITIVE

## 2025-06-18 PROCEDURE — 99214 OFFICE O/P EST MOD 30 MIN: CPT | Performed by: PEDIATRICS

## 2025-06-18 PROCEDURE — 3008F BODY MASS INDEX DOCD: CPT | Performed by: PEDIATRICS

## 2025-06-18 PROCEDURE — 87651 STREP A DNA AMP PROBE: CPT | Performed by: PEDIATRICS

## 2025-06-18 PROCEDURE — 1036F TOBACCO NON-USER: CPT | Performed by: PEDIATRICS

## 2025-06-18 RX ORDER — AMOXICILLIN AND CLAVULANATE POTASSIUM 875; 125 MG/1; MG/1
875 TABLET, FILM COATED ORAL 2 TIMES DAILY
Qty: 20 TABLET | Refills: 0 | Status: SHIPPED | OUTPATIENT
Start: 2025-06-18 | End: 2025-06-28

## 2025-06-18 NOTE — PATIENT INSTRUCTIONS
Patient Information:  Name: Minnie  Age: 19  Medical History: No significant past medical history mentioned.    Reason for Visit:  Minnie came in for a sick visit due to a sore throat that started one day ago. She also has ear pain, a severe headache, nausea, and had a fever with the highest recorded temperature of 102.5 degrees Fahrenheit. She works at a  center.    Clinical Findings:  - General appearance: Normal  - Vital signs: Highest recorded temperature was 102.5 degrees Fahrenheit, currently 98 degrees Fahrenheit  - HEENT: Ears appear normal, tonsils are very injected and about 2+, no pus or lesions observed  - Respiratory: Lungs sound normal  - Gastrointestinal: Abdomen is soft  - Lymphatic: Anterior cervical lymph nodes are 5 cm in diameter, nontender and mobile  - Skin: Warm and dry, no rash  - Neurological: Normal  - Psychiatric: Normal  - Laboratory Studies: PCR test for strep was positive    Diagnosis:  - Strep throat    Treatment Plan:  - Prescription for Augmentin 875 mg: one tablet twice daily for 10 days  - Change laundry, toothbrush, and towels within 24 hours of starting antibiotics  - Urinate at least three times daily (every 6 to 8 hours) to prevent dehydration  - Expected symptom relief within a few hours of starting antibiotics  - Work note issued for convenience    Follow-Up Instructions:  - Take Augmentin 875 mg: one tablet twice daily for 10 days  - Change laundry, toothbrush, and towels within 24 hours of starting antibiotics  - Urinate at least three times daily (every 6 to 8 hours) to prevent dehydration  - Monitor symptoms and expect relief within a few hours of starting antibiotics  - Follow up if symptoms persist or worsen    Additional Notes:  - Prescription sent to pharmacy  - Minnie has not taken any over-the-counter medications like Advil for symptom relief

## 2025-06-18 NOTE — LETTER
June 18, 2025     Patient: Minnie Read   YOB: 2005   Date of Visit: 6/18/2025       To Whom It May Concern:    Minnie Read was seen in my clinic on 6/18/2025 at 9:00 am. Please excuse Minnie for her absence from work on this day to make the appointment.  She may return to work tomorrow.    If you have any questions or concerns, please don't hesitate to call.         Sincerely,         Tana Contreras MD PhD        CC: No Recipients

## 2025-06-18 NOTE — PROGRESS NOTES
"History of Present Illness  The patient is a 19-year-old female who presents for a sick visit, accompanied by her father.    Sore Throat  - Duration: 1 day  - Predominantly on the right side  - Accompanied by bilateral ear pain    Ear Pain  - Duration: 1 day  - Right ear pain    Headache  - Severe headache    Nausea  - Currently feels nauseous  - No episodes of vomiting    Fever  - Highest recorded temperature: 102.5 degrees Fahrenheit  - Current temperature: 98 degrees Fahrenheit    Employment  - Works at a  center    Symptoms Not Present  - No cough  - No runny nose  - No nasal congestion  - No diarrhea    Appetite  - Remains unaffected  - Consumes food every 2 hours    Medications  - Has not taken any over-the-counter medications such as Advil for symptom relief       Current Medications[1]     Allergies[2]     Family History[3]     Pulse (!) 118   Temp 37.2 °C (98.9 °F)   Ht 1.683 m (5' 6.25\")   Wt 73.8 kg (162 lb 9.6 oz)   SpO2 98%   BMI 26.05 kg/m²   Growth percentiles: Facility age limit for growth %jet is 20 years. Facility age limit for growth %jet is 20 years.   Physical Exam  General Appearance: Normal.  Vital signs: Highest recorded temperature was 102.5 degrees Fahrenheit, which has since decreased to 98 degrees.  HEENT: Ears appear normal. Tonsils are very injected and about 2+. No pus or lesions observed.  Respiratory: Lungs sound normal.  Gastrointestinal: Abdomen is soft.  Lymphatic: Anterior cervical lymph nodes are 5 cm in diameter, nontender and mobile.  Skin: Warm and dry, no rash.  Neurological: Normal.  Psychiatric: Normal.        Results  Laboratory Studies  PCR for strep was positive.       Diagnoses and all orders for this visit:  Strep throat  -     POCT NOW STREP A manually resulted  -     amoxicillin-clavulanate (Augmentin) 875-125 mg tablet; Take 1 tablet (875 mg of amoxicillin) by mouth 2 times a day for 10 days.      Assessment & Plan  1. Strep throat  - PCR test for " strep was positive  - Advised to change laundry, toothbrush, and towels within 24 hours of starting antibiotics  - Urinate at least three times daily (every 6 to 8 hours) to prevent dehydration  - Prescription for Augmentin 875 mg: one tablet twice daily for 10 days  - Prescription sent to pharmacy  - Expected symptom relief within a few hours of starting antibiotics  - Work note issued for convenience            [1]   Current Outpatient Medications   Medication Sig Dispense Refill    amoxicillin-clavulanate (Augmentin) 875-125 mg tablet Take 1 tablet (875 mg of amoxicillin) by mouth 2 times a day for 10 days. 20 tablet 0     No current facility-administered medications for this visit.   [2] No Known Allergies  [3] No family history on file.

## 2025-07-29 ENCOUNTER — OFFICE VISIT (OUTPATIENT)
Dept: PEDIATRICS | Facility: CLINIC | Age: 20
End: 2025-07-29
Payer: COMMERCIAL

## 2025-07-29 VITALS — TEMPERATURE: 97.7 F | HEIGHT: 66 IN | BODY MASS INDEX: 25.58 KG/M2 | WEIGHT: 159.2 LBS

## 2025-07-29 DIAGNOSIS — R04.0 RECURRENT EPISTAXIS: Primary | ICD-10-CM

## 2025-07-29 DIAGNOSIS — H66.012 NON-RECURRENT ACUTE SUPPURATIVE OTITIS MEDIA OF LEFT EAR WITH SPONTANEOUS RUPTURE OF TYMPANIC MEMBRANE: ICD-10-CM

## 2025-07-29 PROCEDURE — 3008F BODY MASS INDEX DOCD: CPT | Performed by: PEDIATRICS

## 2025-07-29 PROCEDURE — 1036F TOBACCO NON-USER: CPT | Performed by: PEDIATRICS

## 2025-07-29 PROCEDURE — 99214 OFFICE O/P EST MOD 30 MIN: CPT | Performed by: PEDIATRICS

## 2025-07-29 RX ORDER — LAMOTRIGINE 200 MG/1
1 TABLET ORAL NIGHTLY
COMMUNITY
Start: 2025-07-10

## 2025-07-29 RX ORDER — BUPROPION HYDROCHLORIDE 150 MG/1
1 TABLET, EXTENDED RELEASE ORAL
COMMUNITY
Start: 2025-07-07

## 2025-07-29 RX ORDER — AMOXICILLIN AND CLAVULANATE POTASSIUM 875; 125 MG/1; MG/1
875 TABLET, FILM COATED ORAL 2 TIMES DAILY
Qty: 20 TABLET | Refills: 0 | Status: SHIPPED | OUTPATIENT
Start: 2025-07-29 | End: 2025-08-08

## 2025-07-29 NOTE — PATIENT INSTRUCTIONS
Patient Information:  Name: Minnie  Age: 19  Medical History: Minnie has a history of mood stabilization medications, including Lamictal 200 mg once daily and Wellbutrin  mg. She started these medications in January and has an upcoming appointment with her psychiatrist on Monday. She has experienced nosebleeds and bruising, which may be related to her medications.    Reason for Visit:  Minnie visited the clinic due to left ear pain that began three days ago. She had nasal congestion and frequent nosebleeds last week, followed by a fever of 100.4 degrees Fahrenheit five days ago. A severe nosebleed lasting an hour prompted a visit to the ER, where a strep test returned negative results and her platelet count was found to be normal. Her ear pain worsened significantly yesterday before a concert, with fluid discharge from the ear.    Clinical Findings:  - Left eardrum shows very poor landmarks and has pus in the canal, indicating possible perforation.  - Random bruising and easy bruising, including a 9 cm diameter bruise on her left upper arm.  - Hemoglobin levels are on the lowest end of normal range, indicating potential risk for anemia due to frequent bleeding.    Diagnosis:  - Left ear pain with possible eardrum perforation  - Recurrent epistaxis (nosebleeds)  - Easy bruising  - Mood stabilization    Treatment Plan:  - Prescription for Augmentin 875 mg, to be taken twice daily for 10 days for ear infection.  - Apply a thin layer of antibiotic ointment on both sides of the nose using a Q-tip twice daily.  - Use a humidifier in the room.  - If free of nosebleeds for a week, switch to nasal gel, applying once or twice daily.  - Referral to adult ENT specialist for potential cauterization.  - Complete blood count (CBC) to monitor hemoglobin levels.  - Discuss potential side effects and impact on bleeding issues with psychiatrist at [facility].    Follow-Up Instructions:  - Take Augmentin 875 mg twice daily  for 10 days.  - Apply antibiotic ointment on both sides of the nose twice daily.  - Use a humidifier in your room.  - Switch to nasal gel if free of nosebleeds for a week, applying once or twice daily.  - Schedule a follow-up visit to ensure the ear has healed.  - Complete blood work to monitor hemoglobin levels.  - Discuss bleeding issues with your psychiatrist at your upcoming appointment.    Additional Notes:  - Referral to adult ENT specialist for potential cauterization of the nosebleeds.  - Ensure to bring up the bleeding issues with your psychiatrist during your appointment on Monday.

## 2025-07-29 NOTE — PROGRESS NOTES
"History of Present Illness  The patient is a 19-year-old female who presents for evaluation of left ear pain.    Left Ear Pain  - Began experiencing left ear pain 3 days ago  - Preceded by nasal congestion and nosebleeds occurring 3 to 5 times daily last week  - Temperature of 100.4 degrees Fahrenheit 5 days ago  - Severe nosebleed lasting for an hour two days ago, prompting a visit to the ER  - Strep test at ER returned negative results  - Platelet count found to be normal  - Ear pain was severe last night but has since lessened  - Third instance of eardrum rupturing  - Ear started to hurt significantly yesterday before a concert, with fluid discharge from the ear    Bruising  - Noticed random bruising and easy bruising  - 9 cm diameter bruise on her left upper arm    Medications  - Currently taking Lamictal 200 mg once daily and Wellbutrin  mg for mood stabilization  - Started these medications in January  - No negative side effects from these medications, apart from the nosebleeds  - Medications managed by her psychiatrist  - Upcoming appointment with psychiatrist on Monday    Menstrual Cycles  - Regular menstrual cycles  - Recently lasting 8 to 9 days instead of the usual 5 to 6 days  - Not on birth control    Cough  - Had a cough last week  - Cough has since improved  - Describes cough as being more in her throat    She consumes meat as part of her diet.       Current Medications[1]     Allergies[2]     Family History[3]     Temp 36.5 °C (97.7 °F)   Ht 1.676 m (5' 6\")   Wt 72.2 kg (159 lb 3.2 oz)   BMI 25.70 kg/m²   Growth percentiles: Facility age limit for growth %jet is 20 years. Facility age limit for growth %jet is 20 years.   Physical Exam  General Appearance: Normal.  Vital signs: Within normal limits.  HEENT: Left eardrum shows very poor landmarks and has pus in the canal, indicating possible perforation.  Respiratory: Within normal limits.  Skin: Warm and dry, no rash.  Neurological: " Normal.  Psychiatric: Normal.  Back, Musculoskeletal: A 9 cm diameter bruise is present on the patient's left upper arm.        Results  Laboratory Studies  Strep test was negative. Platelet count was normal. Hemoglobin is on the lowest end.       Diagnoses and all orders for this visit:  Recurrent epistaxis  -     Referral to ENT; Future  -     Reptilase Time; Future  -     Protime-INR; Future  -     Iron and TIBC; Future  -     CBC and Auto Differential; Future  Non-recurrent acute suppurative otitis media of left ear with spontaneous rupture of tympanic membrane  -     amoxicillin-clavulanate (Augmentin) 875-125 mg tablet; Take 1 tablet (875 mg of amoxicillin) by mouth 2 times a day for 10 days.      Assessment & Plan  1. Left ear pain:  - Left eardrum appears perforated with pus in the canal  - Prescription for Augmentin 875 mg, to be taken twice daily for 10 days  - Follow-up appointment to ensure ear has healed    2. Recurrent epistaxis:  - Hemoglobin levels at lower end of normal range, potential risk for anemia due to frequent bleeding  - Discussed side effects of Lamictal and Wellbutrin SR, including decreased fibrin and iron deficiency anemia  - Complete blood count (CBC) to monitor hemoglobin levels  - Apply thin layer of antibiotic ointment on both sides of nose using Q-tip twice daily  - Use humidifier in room  - If free of nosebleeds for a week, switch to nasal gel, applying once or twice daily  - Referral to adult ENT specialist for potential cauterization    3. Easy bruising:  - Reports random bruises and easy bruising, including a 9 cm diameter bruise on left upper arm  - Discussed potential side effects of medications, including decreased fibrin and iron deficiency anemia  - Complete blood count (CBC) to further investigate    4. Mood stabilization:  - Currently on Lamictal 200 mg once a day and Wellbutrin  mg for mood stabilization  - Will discuss potential side effects and impact on  bleeding issues with psychiatrist at [facility]    Follow-up:  - Follow-up visit scheduled to ensure ear has healed            [1]   Current Outpatient Medications   Medication Sig Dispense Refill    buPROPion SR (Wellbutrin SR) 150 mg 12 hr tablet Take 1 tablet (150 mg) by mouth early in the morning..      lamoTRIgine (LaMICtal) 200 mg tablet Take 1 tablet (200 mg) by mouth once daily at bedtime.      amoxicillin-clavulanate (Augmentin) 875-125 mg tablet Take 1 tablet (875 mg of amoxicillin) by mouth 2 times a day for 10 days. 20 tablet 0     No current facility-administered medications for this visit.   [2] No Known Allergies  [3] No family history on file.

## 2025-07-29 NOTE — LETTER
July 29, 2025     Patient: Minnie Read   YOB: 2005   Date of Visit: 7/29/2025       To Whom It May Concern:    Minnie Read was seen in my clinic on 7/29/2025 at 10:30 am. Please excuse Minnie for her absence from school on this day to make the appointment.    If you have any questions or concerns, please don't hesitate to call.         Sincerely,         Tana Contreras MD PhD        CC: No Recipients

## 2025-07-30 LAB
BASOPHILS # BLD AUTO: 23 CELLS/UL (ref 0–200)
BASOPHILS NFR BLD AUTO: 0.3 %
EOSINOPHIL # BLD AUTO: 158 CELLS/UL (ref 15–500)
EOSINOPHIL NFR BLD AUTO: 2.1 %
ERYTHROCYTE [DISTWIDTH] IN BLOOD BY AUTOMATED COUNT: 12.4 % (ref 11–15)
HCT VFR BLD AUTO: 38.6 % (ref 35–45)
HGB BLD-MCNC: 12.3 G/DL (ref 11.7–15.5)
INR PPP: 1
IRON SATN MFR SERPL: 7 % (CALC) (ref 16–45)
IRON SERPL-MCNC: 27 MCG/DL (ref 40–190)
LYMPHOCYTES # BLD AUTO: 1455 CELLS/UL (ref 850–3900)
LYMPHOCYTES NFR BLD AUTO: 19.4 %
MCH RBC QN AUTO: 28.4 PG (ref 27–33)
MCHC RBC AUTO-ENTMCNC: 31.9 G/DL (ref 32–36)
MCV RBC AUTO: 89.1 FL (ref 80–100)
MONOCYTES # BLD AUTO: 548 CELLS/UL (ref 200–950)
MONOCYTES NFR BLD AUTO: 7.3 %
NEUTROPHILS # BLD AUTO: 5318 CELLS/UL (ref 1500–7800)
NEUTROPHILS NFR BLD AUTO: 70.9 %
PLATELET # BLD AUTO: 276 THOUSAND/UL (ref 140–400)
PMV BLD REES-ECKER: 10 FL (ref 7.5–12.5)
PROTHROMBIN TIME: 10.6 SEC (ref 9–11.5)
RBC # BLD AUTO: 4.33 MILLION/UL (ref 3.8–5.1)
REPTILASE TIME: NORMAL S
TIBC SERPL-MCNC: 367 MCG/DL (CALC) (ref 250–450)
WBC # BLD AUTO: 7.5 THOUSAND/UL (ref 3.8–10.8)

## 2025-08-05 LAB
BASOPHILS # BLD AUTO: 23 CELLS/UL (ref 0–200)
BASOPHILS NFR BLD AUTO: 0.3 %
EOSINOPHIL # BLD AUTO: 158 CELLS/UL (ref 15–500)
EOSINOPHIL NFR BLD AUTO: 2.1 %
ERYTHROCYTE [DISTWIDTH] IN BLOOD BY AUTOMATED COUNT: 12.4 % (ref 11–15)
HCT VFR BLD AUTO: 38.6 % (ref 35–45)
HGB BLD-MCNC: 12.3 G/DL (ref 11.7–15.5)
INR PPP: 1
IRON SATN MFR SERPL: 7 % (CALC) (ref 16–45)
IRON SERPL-MCNC: 27 MCG/DL (ref 40–190)
LYMPHOCYTES # BLD AUTO: 1455 CELLS/UL (ref 850–3900)
LYMPHOCYTES NFR BLD AUTO: 19.4 %
MCH RBC QN AUTO: 28.4 PG (ref 27–33)
MCHC RBC AUTO-ENTMCNC: 31.9 G/DL (ref 32–36)
MCV RBC AUTO: 89.1 FL (ref 80–100)
MONOCYTES # BLD AUTO: 548 CELLS/UL (ref 200–950)
MONOCYTES NFR BLD AUTO: 7.3 %
NEUTROPHILS # BLD AUTO: 5318 CELLS/UL (ref 1500–7800)
NEUTROPHILS NFR BLD AUTO: 70.9 %
PLATELET # BLD AUTO: 276 THOUSAND/UL (ref 140–400)
PMV BLD REES-ECKER: 10 FL (ref 7.5–12.5)
PROTHROMBIN TIME: 10.6 SEC (ref 9–11.5)
RBC # BLD AUTO: 4.33 MILLION/UL (ref 3.8–5.1)
REPTILASE TIME: 14 SEC (ref 14–20)
TIBC SERPL-MCNC: 367 MCG/DL (CALC) (ref 250–450)
WBC # BLD AUTO: 7.5 THOUSAND/UL (ref 3.8–10.8)